# Patient Record
Sex: FEMALE | Race: BLACK OR AFRICAN AMERICAN | Employment: UNEMPLOYED | URBAN - METROPOLITAN AREA
[De-identification: names, ages, dates, MRNs, and addresses within clinical notes are randomized per-mention and may not be internally consistent; named-entity substitution may affect disease eponyms.]

---

## 2021-10-26 ENCOUNTER — HOSPITAL ENCOUNTER (EMERGENCY)
Age: 21
Discharge: HOME | End: 2021-10-27
Attending: EMERGENCY MEDICINE
Payer: COMMERCIAL

## 2021-10-26 ENCOUNTER — APPOINTMENT (OUTPATIENT)
Dept: CT IMAGING | Age: 21
End: 2021-10-26
Payer: COMMERCIAL

## 2021-10-26 VITALS
BODY MASS INDEX: 45.39 KG/M2 | WEIGHT: 256.2 LBS | HEART RATE: 98 BPM | DIASTOLIC BLOOD PRESSURE: 77 MMHG | SYSTOLIC BLOOD PRESSURE: 116 MMHG | RESPIRATION RATE: 16 BRPM | TEMPERATURE: 98.6 F | OXYGEN SATURATION: 99 % | HEIGHT: 63 IN

## 2021-10-26 DIAGNOSIS — F32.A DEPRESSION WITH SUICIDAL IDEATION: ICD-10-CM

## 2021-10-26 DIAGNOSIS — N39.0 URINARY TRACT INFECTION WITHOUT HEMATURIA, SITE UNSPECIFIED: ICD-10-CM

## 2021-10-26 DIAGNOSIS — R45.851 DEPRESSION WITH SUICIDAL IDEATION: ICD-10-CM

## 2021-10-26 DIAGNOSIS — T50.902A MEDICATION OVERDOSE, INTENTIONAL SELF-HARM, INITIAL ENCOUNTER (HCC): Primary | ICD-10-CM

## 2021-10-26 PROBLEM — F39 MOOD DISORDER (HCC): Status: ACTIVE | Noted: 2021-10-26

## 2021-10-26 LAB
A/G RATIO: 1.4 (ref 1.1–2.2)
ACETAMINOPHEN LEVEL: <5 UG/ML (ref 10–30)
ALBUMIN SERPL-MCNC: 4.5 G/DL (ref 3.4–5)
ALP BLD-CCNC: 101 U/L (ref 40–129)
ALT SERPL-CCNC: 15 U/L (ref 10–40)
AMPHETAMINE SCREEN, URINE: NORMAL
ANION GAP SERPL CALCULATED.3IONS-SCNC: 14 MMOL/L (ref 3–16)
AST SERPL-CCNC: 20 U/L (ref 15–37)
BACTERIA: ABNORMAL /HPF
BARBITURATE SCREEN URINE: NORMAL
BASOPHILS ABSOLUTE: 0 K/UL (ref 0–0.2)
BASOPHILS RELATIVE PERCENT: 0.2 %
BENZODIAZEPINE SCREEN, URINE: NORMAL
BILIRUB SERPL-MCNC: <0.2 MG/DL (ref 0–1)
BILIRUBIN URINE: NEGATIVE
BLOOD, URINE: NEGATIVE
BUN BLDV-MCNC: 10 MG/DL (ref 7–20)
CALCIUM SERPL-MCNC: 10 MG/DL (ref 8.3–10.6)
CANNABINOID SCREEN URINE: NORMAL
CHLORIDE BLD-SCNC: 106 MMOL/L (ref 99–110)
CLARITY: ABNORMAL
CO2: 22 MMOL/L (ref 21–32)
COCAINE METABOLITE SCREEN URINE: NORMAL
COLOR: YELLOW
CREAT SERPL-MCNC: 0.9 MG/DL (ref 0.6–1.1)
EKG ATRIAL RATE: 91 BPM
EKG DIAGNOSIS: NORMAL
EKG P AXIS: 43 DEGREES
EKG P-R INTERVAL: 146 MS
EKG Q-T INTERVAL: 378 MS
EKG QRS DURATION: 78 MS
EKG QTC CALCULATION (BAZETT): 464 MS
EKG R AXIS: 65 DEGREES
EKG T AXIS: 31 DEGREES
EKG VENTRICULAR RATE: 91 BPM
EOSINOPHILS ABSOLUTE: 0 K/UL (ref 0–0.6)
EOSINOPHILS RELATIVE PERCENT: 0.2 %
EPITHELIAL CELLS, UA: 4 /HPF (ref 0–5)
ETHANOL: NORMAL MG/DL (ref 0–0.08)
GFR AFRICAN AMERICAN: >60
GFR NON-AFRICAN AMERICAN: >60
GLOBULIN: 3.2 G/DL
GLUCOSE BLD-MCNC: 139 MG/DL (ref 70–99)
GLUCOSE URINE: NEGATIVE MG/DL
HCG(URINE) PREGNANCY TEST: NEGATIVE
HCT VFR BLD CALC: 38.1 % (ref 36–48)
HEMOGLOBIN: 11.8 G/DL (ref 12–16)
HYALINE CASTS: 8 /LPF (ref 0–8)
KETONES, URINE: NEGATIVE MG/DL
LEUKOCYTE ESTERASE, URINE: ABNORMAL
LYMPHOCYTES ABSOLUTE: 1.4 K/UL (ref 1–5.1)
LYMPHOCYTES RELATIVE PERCENT: 11.7 %
Lab: NORMAL
MCH RBC QN AUTO: 24.5 PG (ref 26–34)
MCHC RBC AUTO-ENTMCNC: 31 G/DL (ref 31–36)
MCV RBC AUTO: 79 FL (ref 80–100)
METHADONE SCREEN, URINE: NORMAL
MICROSCOPIC EXAMINATION: YES
MONOCYTES ABSOLUTE: 0.6 K/UL (ref 0–1.3)
MONOCYTES RELATIVE PERCENT: 4.7 %
NEUTROPHILS ABSOLUTE: 10 K/UL (ref 1.7–7.7)
NEUTROPHILS RELATIVE PERCENT: 83.2 %
NITRITE, URINE: POSITIVE
OPIATE SCREEN URINE: NORMAL
OXYCODONE URINE: NORMAL
PDW BLD-RTO: 16.8 % (ref 12.4–15.4)
PH UA: 6.5
PH UA: 6.5 (ref 5–8)
PHENCYCLIDINE SCREEN URINE: NORMAL
PLATELET # BLD: 277 K/UL (ref 135–450)
PMV BLD AUTO: 9.5 FL (ref 5–10.5)
POTASSIUM REFLEX MAGNESIUM: 4.2 MMOL/L (ref 3.5–5.1)
PROPOXYPHENE SCREEN: NORMAL
PROTEIN UA: NEGATIVE MG/DL
RBC # BLD: 4.83 M/UL (ref 4–5.2)
RBC UA: 1 /HPF (ref 0–4)
SALICYLATE, SERUM: <0.3 MG/DL (ref 15–30)
SARS-COV-2, NAAT: NOT DETECTED
SODIUM BLD-SCNC: 142 MMOL/L (ref 136–145)
SPECIFIC GRAVITY UA: 1.01 (ref 1–1.03)
TOTAL PROTEIN: 7.7 G/DL (ref 6.4–8.2)
URINE REFLEX TO CULTURE: YES
URINE TYPE: ABNORMAL
UROBILINOGEN, URINE: 0.2 E.U./DL
WBC # BLD: 12 K/UL (ref 4–11)
WBC UA: 60 /HPF (ref 0–5)

## 2021-10-26 PROCEDURE — 93005 ELECTROCARDIOGRAM TRACING: CPT | Performed by: GENERAL ACUTE CARE HOSPITAL

## 2021-10-26 PROCEDURE — 87635 SARS-COV-2 COVID-19 AMP PRB: CPT

## 2021-10-26 PROCEDURE — 80307 DRUG TEST PRSMV CHEM ANLYZR: CPT

## 2021-10-26 PROCEDURE — 87077 CULTURE AEROBIC IDENTIFY: CPT

## 2021-10-26 PROCEDURE — 81001 URINALYSIS AUTO W/SCOPE: CPT

## 2021-10-26 PROCEDURE — 80053 COMPREHEN METABOLIC PANEL: CPT

## 2021-10-26 PROCEDURE — 87186 SC STD MICRODIL/AGAR DIL: CPT

## 2021-10-26 PROCEDURE — 93010 ELECTROCARDIOGRAM REPORT: CPT | Performed by: INTERNAL MEDICINE

## 2021-10-26 PROCEDURE — 80179 DRUG ASSAY SALICYLATE: CPT

## 2021-10-26 PROCEDURE — 87086 URINE CULTURE/COLONY COUNT: CPT

## 2021-10-26 PROCEDURE — 82077 ASSAY SPEC XCP UR&BREATH IA: CPT

## 2021-10-26 PROCEDURE — 6370000000 HC RX 637 (ALT 250 FOR IP): Performed by: GENERAL ACUTE CARE HOSPITAL

## 2021-10-26 PROCEDURE — 84703 CHORIONIC GONADOTROPIN ASSAY: CPT

## 2021-10-26 PROCEDURE — 85025 COMPLETE CBC W/AUTO DIFF WBC: CPT

## 2021-10-26 PROCEDURE — 99285 EMERGENCY DEPT VISIT HI MDM: CPT

## 2021-10-26 PROCEDURE — 70450 CT HEAD/BRAIN W/O DYE: CPT

## 2021-10-26 PROCEDURE — 80143 DRUG ASSAY ACETAMINOPHEN: CPT

## 2021-10-26 RX ORDER — ARIPIPRAZOLE 10 MG/1
10 TABLET ORAL DAILY
Status: ON HOLD | COMMUNITY
End: 2021-10-27

## 2021-10-26 RX ORDER — NITROFURANTOIN 25; 75 MG/1; MG/1
100 CAPSULE ORAL ONCE
Status: COMPLETED | OUTPATIENT
Start: 2021-10-26 | End: 2021-10-26

## 2021-10-26 RX ADMIN — NITROFURANTOIN (MONOHYDRATE/MACROCRYSTALS) 100 MG: 75; 25 CAPSULE ORAL at 15:17

## 2021-10-26 ASSESSMENT — ENCOUNTER SYMPTOMS
VOMITING: 0
BACK PAIN: 0
NAUSEA: 0
WHEEZING: 0
VOICE CHANGE: 0
SHORTNESS OF BREATH: 0
DIARRHEA: 0
SORE THROAT: 0
ABDOMINAL PAIN: 0
CHEST TIGHTNESS: 0

## 2021-10-26 ASSESSMENT — PAIN DESCRIPTION - LOCATION: LOCATION: HEAD

## 2021-10-26 ASSESSMENT — PAIN SCALES - GENERAL: PAINLEVEL_OUTOF10: 8

## 2021-10-26 ASSESSMENT — PAIN DESCRIPTION - ORIENTATION: ORIENTATION: RIGHT

## 2021-10-26 NOTE — ED NOTES
Rapid covid swab completed. Pt tolerated well.  Denied further needs     Farzad Herrera  10/26/21 9775
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
## 2021-10-26 NOTE — CARE COORDINATION
SW consult received. We will follow up with medical staff and patient at bedside momentarily. Respectfully submitted,    JONAS Reno  [facility]   [phone]    Electronically signed by JONAS Staley on 10/26/2021 at 12:30 PM
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
## 2021-10-26 NOTE — ED NOTES
Transferred care back to Jimbo Rucker.  Pt asleep at time      [facility]  10/26/21 3285

## 2021-10-26 NOTE — CARE COORDINATION
SW received phone call from  [address] [place] [address] regarding this patient's care. She stated that the patient was allegedly pregnant in June and they completed a urine screen but it was negative. She stated that when they thought she was pregnant they instructed her to stop taking her medications. She is respectfully requesting a urine screen. SW transferred her to [facility] so they could speak further. [facility] will continue to follow this patient. Respectfully submitted,    JONAS Baird  [facility]   [phone]    Electronically signed by JONAS Serrano on 10/26/2021 at 2:02 PM
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
## 2021-10-26 NOTE — ED NOTES
Bed: B-08  Expected date:   Expected time:   Means of arrival: [facility]  Comments:  suicidal     Gisele Ramos [facility]  10/26/21 7159
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
## 2021-10-26 NOTE — CARE COORDINATION
INITIAL CASE MANAGEMENT ASSESSMENT    Reviewed chart, met with patient to assess possible discharge needs. Explained Case Management role/services. SW interviewed patient alone at bedside today. Living Situation: Patient reports that she resides in an apartment home alone with no pets. There is one flight of stairs leading up to the front door. ADLs: Prior to medical admission patient reports that she was independent. She stated that she doesn't anticipate any needs at discharge. DME: Prior to medical admission patient reports that she used no durable medical equipment. She stated that she doesn't anticipate any needs at discharge. PT/OT Recs: Not ordered. Active Services: Prior to medical admission patient reports that she had a  ([facility] [phone]) and a psychiatrist at [facility]. She stated that the last time she spoke to [address] [place] [address] was yesterday but her last psychiatry appointment was about four months ago and she's been trying to get in. She admits to being out of medication. Transportation: Prior to medical admission patient used lyft and [employer] to get back and forth to medical appointments and errands. She stated that she may need a ride home at discharge. Medications: Patient receives medications from [facility]. At this time she reports no issues with access or affordability. PCP: No primary care provider on file. Patient stated that the last time she saw a provider was prior to turning 19 at [facility]. She is open to receiving a list of PCP's in network with her insurance at discharge. HD/PD: N/A    PLAN/COMMENTS:   1) Medical rule out. 2) Collateral call to [facility]. Left message for  Bryan today. 3) Psych hold or discharge with medication, MD to determine momentarily.      SW/CM provided contact information for patient or family to call with any questions. SW/CM will follow and assist as needed. Respectfully submitted,    Lucila Maddox3, JONAS  [facility]   [phone]    Electronically signed by JONAS Szymanski on 10/26/2021 at 1:21 PM

## 2021-10-27 ENCOUNTER — HOSPITAL ENCOUNTER (INPATIENT)
Age: 21
LOS: 1 days | Discharge: HOME | DRG: 817 | End: 2021-10-27
Attending: PSYCHIATRY & NEUROLOGY | Admitting: PSYCHIATRY & NEUROLOGY
Payer: COMMERCIAL

## 2021-10-27 VITALS
TEMPERATURE: 97.7 F | BODY MASS INDEX: 45.36 KG/M2 | HEIGHT: 63 IN | OXYGEN SATURATION: 100 % | RESPIRATION RATE: 16 BRPM | SYSTOLIC BLOOD PRESSURE: 128 MMHG | DIASTOLIC BLOOD PRESSURE: 61 MMHG | HEART RATE: 92 BPM | WEIGHT: 256 LBS

## 2021-10-27 PROCEDURE — 1240000000 HC EMOTIONAL WELLNESS R&B

## 2021-10-27 PROCEDURE — 6370000000 HC RX 637 (ALT 250 FOR IP): Performed by: PSYCHIATRY & NEUROLOGY

## 2021-10-27 PROCEDURE — 99220 PR INITIAL OBSERVATION CARE/DAY 70 MINUTES: CPT | Performed by: PSYCHIATRY & NEUROLOGY

## 2021-10-27 PROCEDURE — 99221 1ST HOSP IP/OBS SF/LOW 40: CPT | Performed by: NURSE PRACTITIONER

## 2021-10-27 PROCEDURE — 5130000000 HC BRIDGE APPOINTMENT

## 2021-10-27 RX ORDER — TRAZODONE HYDROCHLORIDE 50 MG/1
50 TABLET ORAL NIGHTLY PRN
Status: DISCONTINUED | OUTPATIENT
Start: 2021-10-27 | End: 2021-10-27 | Stop reason: HOSPADM

## 2021-10-27 RX ORDER — ACETAMINOPHEN 325 MG/1
650 TABLET ORAL EVERY 4 HOURS PRN
Status: DISCONTINUED | OUTPATIENT
Start: 2021-10-27 | End: 2021-10-27 | Stop reason: HOSPADM

## 2021-10-27 RX ORDER — HYDROXYZINE PAMOATE 25 MG/1
50 CAPSULE ORAL 3 TIMES DAILY PRN
Status: DISCONTINUED | OUTPATIENT
Start: 2021-10-27 | End: 2021-10-27 | Stop reason: HOSPADM

## 2021-10-27 RX ORDER — ARIPIPRAZOLE 10 MG/1
10 TABLET ORAL DAILY
Qty: 30 TABLET | Refills: 0 | Status: SHIPPED | OUTPATIENT
Start: 2021-10-27

## 2021-10-27 RX ORDER — ARIPIPRAZOLE 10 MG/1
10 TABLET ORAL DAILY
Status: DISCONTINUED | OUTPATIENT
Start: 2021-10-27 | End: 2021-10-27 | Stop reason: HOSPADM

## 2021-10-27 RX ORDER — POLYETHYLENE GLYCOL 3350 17 G/17G
17 POWDER, FOR SOLUTION ORAL DAILY PRN
Status: DISCONTINUED | OUTPATIENT
Start: 2021-10-27 | End: 2021-10-27 | Stop reason: HOSPADM

## 2021-10-27 RX ADMIN — HYDROXYZINE PAMOATE 50 MG: 25 CAPSULE ORAL at 02:18

## 2021-10-27 RX ADMIN — ARIPIPRAZOLE 10 MG: 10 TABLET ORAL at 14:34

## 2021-10-27 ASSESSMENT — SLEEP AND FATIGUE QUESTIONNAIRES
AVERAGE NUMBER OF SLEEP HOURS: 7
DO YOU USE A SLEEP AID: NO
AVERAGE NUMBER OF SLEEP HOURS: 7
DO YOU HAVE DIFFICULTY SLEEPING: NO
DO YOU USE A SLEEP AID: NO
DO YOU HAVE DIFFICULTY SLEEPING: NO

## 2021-10-27 ASSESSMENT — LIFESTYLE VARIABLES
HISTORY_ALCOHOL_USE: NO
HISTORY_ALCOHOL_USE: NO

## 2021-10-27 ASSESSMENT — PAIN SCALES - GENERAL
PAINLEVEL_OUTOF10: 0
PAINLEVEL_OUTOF10: 0

## 2021-10-27 ASSESSMENT — PATIENT HEALTH QUESTIONNAIRE - PHQ9: SUM OF ALL RESPONSES TO PHQ QUESTIONS 1-9: 1

## 2021-10-27 NOTE — FLOWSHEET NOTE
Purposeful Rounding    Patient Location: Day room    Patient willing to engage in conversation: Yes    Presentation/behavior: Cooperative and Pleasant    Affect: Brightens with interaction    Concerns reported: none    PRN medications given: none    Environmental assessment: Room free from clutter, Clear path to bathroom  and No safety hazards noted    Fall prevention interventions in place: Yellow non-skid socks on    Daily [place] Fall Risk Score: 59    Daily [place] Fall Risk Score: low      Electronically signed by Dayday Guadalupe RN on 10/27/21 at 12:15 PM EDT

## 2021-10-27 NOTE — FLOWSHEET NOTE
Purposeful Rounding    Patient Location: Day room    Patient willing to engage in conversation: Yes    Presentation/behavior: Cooperative and Pleasant    Affect: Neutral/Euthymic(normal)    Concerns reported: none    PRN medications given: none    Environmental assessment: Room free from clutter, Clear path to bathroom  and No safety hazards noted    Fall prevention interventions in place: Yellow non-skid socks on    Daily [place] Fall Risk Score: 59   Daily [place] Fall Risk Score: low      Electronically signed by Tracie Ospina RN on 10/27/21 at 2:40 PM EDT
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
## 2021-10-27 NOTE — GROUP NOTE
Group Therapy Note    Date: 10/27/2021    Group Start Time: 1100  Group End Time: 1150  Group Topic: Cognitive Skills    [id number] [facility]        Group Therapy Note    Attendees: 7    Group members were asked to write out their names and use a positive strength that began with each letter of their name. Notes:  Breanna attended group for the full duration. Amanda Orellana completed her activity and remained engaged throughout. Status After Intervention:  Improved    Participation Level:  Active Listener and Interactive    Participation Quality: Appropriate and Attentive      Speech:  normal      Thought Process/Content: Logical      Affective Functioning: Congruent      Mood: euthymic      Level of consciousness:  Alert, Oriented x4 and Attentive      Response to Learning: Able to verbalize current knowledge/experience      Endings: None Reported    Modes of Intervention: Activity      Discipline Responsible: [facility]      Signature:  NATALIO Hare

## 2021-10-27 NOTE — SUICIDE SAFETY PLAN
number for [facility] is [phone]. The crisis number for [facility] is 281-CARE. This crisis line is available 24 hours a day, seven days a week. The crisis number for [facility] is [phone]. This crisis line is available 24 hours a day, seven days a week. The crisis number for [facility] is 1-007-760-TALK. You can use this number at any time to access emergency mental health services. A National Crisis Number is 0-448-ZGETAEU (6-373.906.2613). You can use this number at any time to access emergency mental health services. The crisis number for [facility] is [phone]. You can use this number at any time to access emergency mental health services. [facility] is [phone] ([phone]). You can use this number at any time to access emergency mental health services. Making the environment safe: How can I make my environment (house/apartment/living space) safer? For example, can I remove guns, medications, and other items? 1. Think positive  2.  No weapons in the home

## 2021-10-27 NOTE — ED NOTES
Report called to Ashley Briscoe RN at [facility]. All questions answered, EMTALA flowsheet updated.       Elsie Ernst RN  10/26/21 3203

## 2021-10-27 NOTE — PLAN OF CARE
Problem: Suicide risk  Goal: Provide patient with safe environment  Description: Provide patient with safe environment  Outcome: Ongoing     Problem: Altered Mood, Depressive Behavior:  Goal: Able to verbalize acceptance of life and situations over which he or she has no control  Description: Able to verbalize acceptance of life and situations over which he or she has no control  Outcome: Ongoing     Problem: Altered Mood, Depressive Behavior:  Goal: Able to verbalize and/or display a decrease in depressive symptoms  Description: Able to verbalize and/or display a decrease in depressive symptoms  Outcome: Ongoing     Problem: Altered Mood, Depressive Behavior:  Goal: Ability to disclose and discuss suicidal ideas will improve  Description: Ability to disclose and discuss suicidal ideas will improve  Outcome: Ongoing     Problem: Altered Mood, Depressive Behavior:  Goal: Able to verbalize support systems  Description: Able to verbalize support systems  Outcome: Ongoing  Patient visible on unit. Social with peers. Attended and participated in groups. She was calm and  cooperative with interview. She denies SI/HI/AVH. She does have a long history of depression and stated she just felt overwhelmed at the time of admission. She contracts for safety and states she does have a good support system. She feels ready for discharge.

## 2021-10-27 NOTE — FLOWSHEET NOTE
Purposeful Rounding    Patient Location: Day room    Patient willing to engage in conversation: Yes    Presentation/behavior: Cooperative and Pleasant    Affect: Neutral/Euthymic(normal)    Concerns reported: none    PRN medications given: none    Environmental assessment: Room free from clutter, Clear path to bathroom  and No safety hazards noted    Fall prevention interventions in place: Yellow non-skid socks on    Daily [place] Fall Risk Score: 59    Daily [place] Fall Risk Score: low      Electronically signed by Kristy Larson RN on 10/27/21 at 9:09 AM EDT

## 2021-10-27 NOTE — GROUP NOTE
Group Therapy Note    Date: 10/27/2021    Group Start Time: 1000  Group End Time: 1139  Group Topic: Psychoeducation    [facility],5Th Floor, LISW        Group Therapy Note    Attendees: 9    Participants learned about \"How to feel your feelings. \"         Notes:  Kaylen Cole attended group and was engaged and participated throughout. She was outgoing and talked easily with her peers and shared her emotions wheel with the group. Status After Intervention:  Improved    Participation Level:  Active Listener and Interactive    Participation Quality: Appropriate, Attentive, Sharing and Supportive      Speech:  normal      Thought Process/Content: Logical      Affective Functioning: Congruent      Mood: euthymic      Level of consciousness:  Alert, Oriented x4 and Attentive      Response to Learning: Able to verbalize/acknowledge new learning and Able to retain information      Endings: None Reported    Modes of Intervention: Education, Support, Socialization and Activity      Discipline Responsible: /Counselor      Signature:  CHRIS Spicer

## 2021-10-27 NOTE — ED NOTES
Assumed care of patient. Sitter remains at the bedside. Patient states to this RN \"I'm good, I think I need to go home. I have business I have to take care of.\" Reinforced to patient that she's on a 72 hour hold for her safety. Patient continues to state that she has business and that she's not going to hurt herself. Reinforced that patient presented to the ED after  A drug overdose with intent to hurt herself. Patient seems to be escalating the longer the RN remains in the room, RN excused herself and encouraged patient to calm herself down and RN could return and answer any questions she had. Transport to arrive at 2300.       Ingrid Shah RN  10/26/21 2027

## 2021-10-27 NOTE — PROGRESS NOTES
[facility]  Discharge Note    Pt discharged with followings belongings:   Dentures: None  Vision - Corrective Lenses: Glasses  Hearing Aid: None  Jewelry: None  Body Piercings Removed: N/A  Clothing: Pants, Shirt, Footwear  Were All Patient Medications Collected?: Not Applicable  Other Valuables: Money (Comment) ($12, 7 credit cards and 's license in safe )   Valuables sent home with patient. Patient education on aftercare instructions: yes Information faxed to [place] by RN  at 3:25 PM .Patient verbalize understanding of AVS:  yes    Status EXAM upon discharge:  Status and Exam  Normal: No  Facial Expression: Flat  Affect: Constricted  Level of Consciousness: Alert  Mood:Normal: No  Mood: Depressed  Motor Activity:Normal: Yes  Interview Behavior: Cooperative, Evasive  Preception: [place] to Person, Vannesa Shank to Time, [place] to Place, [place] to Situation  Attention:Normal: Yes  Thought Processes: Other(See comment) (WNL)  Thought Content:Normal: Yes  Hallucinations: None  Delusions: No  Memory:Normal: Yes  Insight and Judgment: No (impaired)  Insight and Judgment: Poor Judgment, Poor Insight  Present Suicidal Ideation: No  Present Homicidal Ideation: No      Metabolic Screening:    No results found for: LABA1C    No results found for: CHOL  No results found for: TRIG  No results found for: HDL  No components found for: LDLCAL  No results found for: Jennifer Medina, [address] Appointment completed: Reviewed Discharge Instructions with patient. Patient verbalizes understanding and agreement with the discharge plan using the teachback method.      Referral for Outpatient Tobacco Cessation Counseling, upon discharge (marely X if applicable and completed):    ( )  Hospital staff assisted patient to call Quit Line or faxed referral during hospitalization                  ( )  Recognizing danger situations (included triggers and roadblocks), if not completed on admission ( )  Coping skills (new ways to manage stress, exercise, relaxation techniques, changing routine, distraction), if not completed on admission                                                           ( )  Basic information about quitting (benefits of quitting, techniques in how to quit, available resources, if not completed on admission  ( ) Referral for counseling faxed to [facility]   ( ) Patient refused referral  ( ) Patient refused counseling  ( ) Patient refused smoking cessation medication upon discharge  (X) Patient not smoked in the last 30 days    Vaccinations (marely X if applicable and completed):  ( ) Patient states already received influenza vaccine elsewhere  ( ) Patient received influenza vaccine during this hospitalization  (x ) Patient refused influenza vaccine at this time

## 2021-10-27 NOTE — GROUP NOTE
Group Therapy Note    Date: 10/27/2021    Group Start Time: 2582  Group End Time: 1400  Group Topic: [facility] Anali, MSW        Group Therapy Note    Education Topic: Overcoming the Fear of Change    Mode of Intervention: Dedra Analysis, Process & Discussion    Song Used: These Days by Seamus Garcia    Attendees: 6           Notes:  Pt present and attentive across discussions, reflective while processing role of self-sabotage through inaction and \"staying in unhealthy mindsets\". Pt left group early, did not return across remainder of group therapy session.      Status After Intervention:  Improved    Participation Level: Interactive    Participation Quality: Sharing      Speech:  normal      Thought Process/Content: Logical      Affective Functioning: Congruent      Mood: euthymic      Level of consciousness:  Alert and Attentive      Response to Learning: Progressing to goal      Endings: None Reported    Modes of Intervention: Education, Socialization, Exploration and Media      Discipline Responsible: Psychoeducational Specialist      Signature:  Jarvis Martínez, MM, MT-BC

## 2021-10-28 LAB
ORGANISM: ABNORMAL
URINE CULTURE, ROUTINE: ABNORMAL
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
